# Patient Record
Sex: FEMALE | URBAN - METROPOLITAN AREA
[De-identification: names, ages, dates, MRNs, and addresses within clinical notes are randomized per-mention and may not be internally consistent; named-entity substitution may affect disease eponyms.]

---

## 2017-12-11 ENCOUNTER — APPOINTMENT (OUTPATIENT)
Age: 27
Setting detail: DERMATOLOGY
End: 2018-01-03

## 2017-12-11 DIAGNOSIS — L90.6 STRIAE ATROPHICAE: ICD-10-CM

## 2017-12-11 PROCEDURE — OTHER FRAXEL: OTHER

## 2017-12-11 PROCEDURE — OTHER COUNSELING: OTHER

## 2017-12-11 ASSESSMENT — LOCATION ZONE DERM: LOCATION ZONE: TRUNK

## 2017-12-11 ASSESSMENT — LOCATION DETAILED DESCRIPTION DERM
LOCATION DETAILED: LEFT LATERAL SUPERIOR CHEST
LOCATION DETAILED: RIGHT MEDIAL SUPERIOR CHEST

## 2017-12-11 ASSESSMENT — LOCATION SIMPLE DESCRIPTION DERM: LOCATION SIMPLE: CHEST

## 2017-12-11 NOTE — PROCEDURE: FRAXEL
Treatment Level: 1
External Cooling: Alton Cryo 5
Detail Level: Detailed
Treatment Level: 6
Anesthesia Type: 1% lidocaine with epinephrine
Energy(Mj/Cm2): 18
Depth In Microns (Use Numbers Only, No Special Characters Or $): 980
Price (Use Numbers Only, No Special Characters Or $): 150.0
Number Of Passes: 4
Treatment Level: 10
Indication: striae
Treatment Level: 8
Wavelength: 1550nm
Location: lower abdomen
Location: full face except eyelids
Location: upper and lower lids
Consent: Written consent obtained, risks reviewed including but not limited to pain and incomplete improvement of photodamage and scarring.
Add Post-Care Below To The Note: No
Energy(Mj/Cm2): 30
Post-Care Instructions: I reviewed with the patient in detail post-care instructions. Patient should avoid sun until area fully healed.
Energy(Mj/Cm2): 70
Total Coverage: 15%

## 2017-12-11 NOTE — HPI: SKIN LESION (STRETCH MARKS)
How Severe Are Your Stretch Marks?: mild
Have Your Stretch Marks Been Treated?: not been treated
Is This A New Presentation, Or A Follow-Up?: Stretch Marks

## 2018-02-07 ENCOUNTER — APPOINTMENT (OUTPATIENT)
Age: 28
Setting detail: DERMATOLOGY
End: 2018-03-03

## 2018-02-07 DIAGNOSIS — L90.6 STRIAE ATROPHICAE: ICD-10-CM

## 2018-02-07 PROCEDURE — OTHER COUNSELING: OTHER

## 2018-02-07 PROCEDURE — OTHER FRAXEL: OTHER

## 2018-02-07 ASSESSMENT — LOCATION DETAILED DESCRIPTION DERM
LOCATION DETAILED: LEFT LATERAL SUPERIOR CHEST
LOCATION DETAILED: RIGHT MEDIAL SUPERIOR CHEST

## 2018-02-07 ASSESSMENT — LOCATION SIMPLE DESCRIPTION DERM: LOCATION SIMPLE: CHEST

## 2018-02-07 ASSESSMENT — LOCATION ZONE DERM: LOCATION ZONE: TRUNK

## 2018-02-07 NOTE — PROCEDURE: FRAXEL
Treatment Level: 1
Post-Care Instructions: I reviewed with the patient in detail post-care instructions. Patient should avoid sun until area fully healed.
Energy(Mj/Cm2): 18
Treatment Level: 8
Treatment Number: 2
Consent: Written consent obtained, risks reviewed including but not limited to pain and incomplete improvement of photodamage and scarring.
Number Of Passes: 6
Price (Use Numbers Only, No Special Characters Or $): 300.00
Wavelength: 1550nm
External Cooling: Alton Cryo 5
Number Of Passes: 4
Depth In Microns (Use Numbers Only, No Special Characters Or $): 980
Total Energy In Kj (Optional- Don't Include Units): 2.17
Add Post-Care Below To The Note: No
Total Coverage: 15%
Energy(Mj/Cm2): 30
Indication: striae
Location: upper and lower lids
Anesthesia Type: 1% lidocaine with epinephrine
Detail Level: Detailed
Location: lower abdomen
Energy(Mj/Cm2): 70
Treatment Level: 10
Location: full face except eyelids

## 2018-11-05 ENCOUNTER — OFFICE VISIT (OUTPATIENT)
Dept: FAMILY MEDICINE | Facility: CLINIC | Age: 28
End: 2018-11-05
Payer: COMMERCIAL

## 2018-11-05 VITALS
WEIGHT: 159.6 LBS | BODY MASS INDEX: 26.59 KG/M2 | OXYGEN SATURATION: 100 % | HEIGHT: 65 IN | SYSTOLIC BLOOD PRESSURE: 129 MMHG | TEMPERATURE: 97.8 F | DIASTOLIC BLOOD PRESSURE: 84 MMHG | HEART RATE: 84 BPM

## 2018-11-05 DIAGNOSIS — Z32.00 PREGNANCY EXAMINATION OR TEST, PREGNANCY UNCONFIRMED: Primary | ICD-10-CM

## 2018-11-05 LAB — BETA HCG QUAL IFA URINE: NEGATIVE

## 2018-11-05 PROCEDURE — 84703 CHORIONIC GONADOTROPIN ASSAY: CPT | Performed by: NURSE PRACTITIONER

## 2018-11-05 PROCEDURE — 99203 OFFICE O/P NEW LOW 30 MIN: CPT | Performed by: NURSE PRACTITIONER

## 2018-11-05 NOTE — MR AVS SNAPSHOT
After Visit Summary   11/5/2018    Emily Goldman    MRN: [id number]           Patient Information     Date Of Birth          1990        Visit Information        Provider Department      11/5/2018 6:00 PM Joyce Rivero APRN CNP [facility]        Today's Diagnoses     Pregnancy examination or test, pregnancy unconfirmed    -  1      Care Instructions    At [facility], we strive to deliver an exceptional experience to you, every time we see you.  If you receive a survey in the mail, please send us back your thoughts. We really do value your feedback.    Your care team:                            Family Medicine Internal Medicine   MD Cesar Nicholson MD Shantel Branch-Fleming, MD Katya Georgiev PA-C Megan Hill, APRN CNP Nam Ho, MD Pediatrics   YOLANDA Trujillo, MD Tammy Jacobs APRMD Ximena Britton CNP, MD Deborah Mielke, MD Kim Thein, SHANNAN [facility]      Clinic hours: Monday - Thursday 7 am-7 pm; Fridays 7 am-5 pm.   Urgent care: Monday - Friday 11 am-9 pm; Saturday and Sunday 9 am-5 pm.  Pharmacy : Monday -Thursday 8 am-8 pm; Friday 8 am-6 pm; Saturday and Sunday 9 am-5 pm.     Clinic: [phone]   Pharmacy: [phone]        Preparing for Pregnancy  Even before you become pregnant, your health matters to your future baby. Adopt good health habits today. And take care of any medical problems you have before becoming pregnant.  Remember: As soon as you know you are pregnant, get regular prenatal care.   Things to consider  Read through the list below. The more items that describe you, the healthier you may be:    I eat a balanced diet.    I keep physically active.    I have my health problems under control.    My weight is about right.    I don t smoke.    I don t use recreational drugs.    I don t have a drinking problem.  Think about the following:    Who will  help you through pregnancy and with childcare?    Do you have health insurance?    Do you have the money needed to cover childcare and other day-to-day child expenses?    Will you be able to take the time you need away from your job for maternity needs and childcare?  Adopt a healthy lifestyle  Each of the following tips can improve your health as you prepare for pregnancy:    Take folic acid 400 to 800 micrograms or a prenatal vitamin daily.     Eat a healthy, well-balanced diet.    Exercise 3 or more times a week and at least 150 minutes weekly.    Get within 15 pounds of your ideal weight.  The first weeks of pregnancy are the most important time in a baby s development. Before you become pregnant:    Don t use recreational drugs.    Don t drink alcohol.    Don t smoke.    Get recommended vaccines.  Working with your healthcare provider  Your healthcare provider can help answer any questions you may have. Do you know when to stop taking birth control pills? Are any over-the-counter medicines safe for pregnant women? You can also ask about special care you may need if you have any of the following:    Sexually transmitted diseases (STDs), like herpes or chlamydia    Diabetes    High blood pressure    Other chronic health problems   Date Last Reviewed: 10/1/2017    4047-8711 [employer]. [address] [place] [address]. All rights reserved. This information is not intended as a substitute for professional medical care. Always follow your healthcare professional's instructions.                Follow-ups after your visit        Who to contact     If you have questions or need follow up information about today's clinic visit or your schedule please contact [facility] directly at [phone].  Normal or non-critical lab and imaging results will be communicated to you by MyChart, letter or phone within 4 business days after the clinic has received the results. If you do not  "hear from us within 7 days, please contact the clinic through [employer] or phone. If you have a critical or abnormal lab result, we will notify you by phone as soon as possible.  Submit refill requests through [employer] or call your pharmacy and they will forward the refill request to us. Please allow 3 business days for your refill to be completed.          Additional Information About Your Visit        [employer] Information     [employer] lets you send messages to your doctor, view your test results, renew your prescriptions, schedule appointments and more. To sign up, go to [email or web address] . Click on \"Log in\" on the left side of the screen, which will take you to the Welcome page. Then click on \"Sign up Now\" on the right side of the page.     You will be asked to enter the access code listed below, as well as some personal information. Please follow the directions to create your username and password.     Your access code is: [email or web address]  Expires: 2/3/2019  6:23 PM     Your access code will  in 90 days. If you need help or a new code, please call your [facility] clinic or [phone].        Care EveryWhere ID     This is your Care EveryWhere ID. This could be used by other organizations to access your [facility] medical records  [id number]        Your Vitals Were     Pulse Temperature Height Last Period Pulse Oximetry BMI (Body Mass Index)    84 97.8  F (36.6  C) (Oral) 5' 5.35\" (1.66 m) 10/15/2018 100% 26.27 kg/m2       Blood Pressure from Last 3 Encounters:   18 129/84    Weight from Last 3 Encounters:   18 159 lb 9.6 oz (72.4 kg)              We Performed the Following     Beta HCG qual IFA urine - [facility] and [facility]        Primary Care Provider Office Phone # Fax #    [facility] [phone]       [id number]  [facility] [id number]        Equal Access to Services     JUAN MIGUEL HOOPER AH: Hadii aad yifan mayer Soaditya, wawaldemarda tommieadaroopa, qaybta kaalmada " dejuan dillpaulettejean la'aatimbo ah. Merary Redwood -[phone].    ATENCIÓN: Si habla español, tiene a jaimes disposición servicios gratuitos de asistencia lingüística. Ben al [phone].    We comply with applicable federal civil rights laws and [place] laws. We do not discriminate on the basis of race, color, national origin, age, disability, sex, sexual orientation, or gender identity.            Thank you!     Thank you for choosing [facility]  for your care. Our goal is always to provide you with excellent care. Hearing back from our patients is one way we can continue to improve our services. Please take a few minutes to complete the written survey that you may receive in the mail after your visit with us. Thank you!             Your Updated Medication List - Protect others around you: Learn how to safely use, store and throw away your medicines at www.disposemymeds.org.      Notice  As of 11/5/2018  6:24 PM    You have not been prescribed any medications.

## 2018-11-06 NOTE — PROGRESS NOTES
"  SUBJECTIVE:   Emily Goldman is a 28 year old female who presents to clinic today for the following health issues:    Pt is here for confirmation of pregnancy.LMP 10/15/18 and lasted 3-4 days (her norm).  Patient is trying to conceive but has been trying for only 1 month.       Problem list and histories reviewed & adjusted, as indicated.  Additional history: as documented    There is no problem list on file for this patient.    History reviewed. No pertinent surgical history.    Social History   Substance Use Topics     Smoking status: Never Smoker     Smokeless tobacco: Never Used     Alcohol use No     History reviewed. No pertinent family history.      No current outpatient prescriptions on file.     BP Readings from Last 3 Encounters:   11/05/18 129/84    Wt Readings from Last 3 Encounters:   11/05/18 159 lb 9.6 oz (72.4 kg)                    Reviewed and updated as needed this visit by clinical staff  Tobacco  Allergies  Meds  Med Hx  Surg Hx  Fam Hx  Soc Hx      Reviewed and updated as needed this visit by Provider         ROS:  Constitutional, HEENT, cardiovascular, pulmonary, gi and gu systems are negative, except as otherwise noted.    OBJECTIVE:     /84  Pulse 84  Temp 97.8  F (36.6  C) (Oral)  Ht 5' 5.35\" (1.66 m)  Wt 159 lb 9.6 oz (72.4 kg)  LMP 10/15/2018  SpO2 100%  BMI 26.27 kg/m2  Body mass index is 26.27 kg/(m^2).  GENERAL: healthy, alert and no distress  EYES: Eyes grossly normal to inspection, PERRL and conjunctivae and sclerae normal  HENT: ear canals and TM's normal, nose and mouth without ulcers or lesions  NECK: no adenopathy, no asymmetry, masses, or scars and thyroid normal to palpation  RESP: lungs clear to auscultation - no rales, rhonchi or wheezes  CV: regular rate and rhythm, normal S1 S2, no S3 or S4, no murmur, click or rub, no peripheral edema and peripheral pulses strong  ABDOMEN: soft, nontender, no hepatosplenomegaly, no masses and bowel sounds normal  MS: " "no gross musculoskeletal defects noted, no edema  SKIN: no suspicious lesions or rashes  NEURO: Normal strength and tone, mentation intact and speech normal  BACK: no CVA tenderness, no paralumbar tenderness  PSYCH: mentation appears normal, affect normal/bright  LYMPH: normal ant/post cervical, supraclavicular nodes    Diagnostic Test Results:  Results for orders placed or performed in visit on 11/05/18 (from the past 24 hour(s))   Beta HCG qual IFA urine - [facility]   Result Value Ref Range    Beta HCG Qual IFA Urine Negative NEG^Negative          ASSESSMENT/PLAN:       BP Screening:   Last 3 BP Readings:    BP Readings from Last 3 Encounters:   11/05/18 129/84       The following was recommended to the patient:  Re-screen BP within a year and recommended lifestyle modifications  BMI:   Estimated body mass index is 26.27 kg/(m^2) as calculated from the following:    Height as of this encounter: 5' 5.35\" (1.66 m).    Weight as of this encounter: 159 lb 9.6 oz (72.4 kg).         Emily was seen today for pregnancy test.    Diagnoses and all orders for this visit:    Pregnancy examination or test, pregnancy unconfirmed  -     Beta HCG qual IFA urine - [facility]    Explained that it is not abnormal for it to take up to 1 year to conceive.  Reviewed optimal timing of intercourse around ovulation, healthy nutrition, exercise/lifestyle discussed.        Work on weight loss  Regular exercise  See Patient Instructions    SHANNAN [facility]  "

## 2018-11-06 NOTE — PATIENT INSTRUCTIONS
At [facility], we strive to deliver an exceptional experience to you, every time we see you.  If you receive a survey in the mail, please send us back your thoughts. We really do value your feedback.    Your care team:                            Family Medicine Internal Medicine   MD Cesar Nicholson MD Shantel Branch-Fleming, MD Katya Georgiev PA-C Megan Hill, APRN CNP    Dennis Robb, MD Pediatrics   Jonathan Wild, YOLANDA Soto, MD Tammy Jacobs APRN CNP   MD Ximena Booth MD Deborah Mielke, MD Danni Rivero, APRN [facility]      Clinic hours: Monday - Thursday 7 am-7 pm; Fridays 7 am-5 pm.   Urgent care: Monday - Friday 11 am-9 pm; Saturday and Sunday 9 am-5 pm.  Pharmacy : Monday -Thursday 8 am-8 pm; Friday 8 am-6 pm; Saturday and Sunday 9 am-5 pm.     Clinic: [phone]   Pharmacy: [phone]        Preparing for Pregnancy  Even before you become pregnant, your health matters to your future baby. Adopt good health habits today. And take care of any medical problems you have before becoming pregnant.  Remember: As soon as you know you are pregnant, get regular prenatal care.   Things to consider  Read through the list below. The more items that describe you, the healthier you may be:    I eat a balanced diet.    I keep physically active.    I have my health problems under control.    My weight is about right.    I don t smoke.    I don t use recreational drugs.    I don t have a drinking problem.  Think about the following:    Who will help you through pregnancy and with childcare?    Do you have health insurance?    Do you have the money needed to cover childcare and other day-to-day child expenses?    Will you be able to take the time you need away from your job for maternity needs and childcare?  Adopt a healthy lifestyle  Each of the following tips can improve your health as you prepare for pregnancy:    Take folic acid 400 to  800 micrograms or a prenatal vitamin daily.     Eat a healthy, well-balanced diet.    Exercise 3 or more times a week and at least 150 minutes weekly.    Get within 15 pounds of your ideal weight.  The first weeks of pregnancy are the most important time in a baby s development. Before you become pregnant:    Don t use recreational drugs.    Don t drink alcohol.    Don t smoke.    Get recommended vaccines.  Working with your healthcare provider  Your healthcare provider can help answer any questions you may have. Do you know when to stop taking birth control pills? Are any over-the-counter medicines safe for pregnant women? You can also ask about special care you may need if you have any of the following:    Sexually transmitted diseases (STDs), like herpes or chlamydia    Diabetes    High blood pressure    Other chronic health problems   Date Last Reviewed: 10/1/2017    4551-2437 [employer]. [address] [place] [address]. All rights reserved. This information is not intended as a substitute for professional medical care. Always follow your healthcare professional's instructions.